# Patient Record
Sex: MALE | Race: WHITE | NOT HISPANIC OR LATINO
[De-identification: names, ages, dates, MRNs, and addresses within clinical notes are randomized per-mention and may not be internally consistent; named-entity substitution may affect disease eponyms.]

---

## 2019-10-31 ENCOUNTER — FORM ENCOUNTER (OUTPATIENT)
Age: 64
End: 2019-10-31

## 2019-11-01 ENCOUNTER — APPOINTMENT (OUTPATIENT)
Dept: ORTHOPEDIC SURGERY | Facility: CLINIC | Age: 64
End: 2019-11-01
Payer: COMMERCIAL

## 2019-11-01 ENCOUNTER — OUTPATIENT (OUTPATIENT)
Dept: OUTPATIENT SERVICES | Facility: HOSPITAL | Age: 64
LOS: 1 days | End: 2019-11-01
Payer: COMMERCIAL

## 2019-11-01 VITALS
DIASTOLIC BLOOD PRESSURE: 80 MMHG | HEIGHT: 71 IN | SYSTOLIC BLOOD PRESSURE: 120 MMHG | WEIGHT: 200 LBS | BODY MASS INDEX: 28 KG/M2

## 2019-11-01 DIAGNOSIS — M75.42 IMPINGEMENT SYNDROME OF LEFT SHOULDER: ICD-10-CM

## 2019-11-01 DIAGNOSIS — M75.02 ADHESIVE CAPSULITIS OF LEFT SHOULDER: ICD-10-CM

## 2019-11-01 PROCEDURE — 99204 OFFICE O/P NEW MOD 45 MIN: CPT | Mod: 25

## 2019-11-01 PROCEDURE — 73030 X-RAY EXAM OF SHOULDER: CPT

## 2019-11-01 PROCEDURE — 20611 DRAIN/INJ JOINT/BURSA W/US: CPT | Mod: LT

## 2019-11-01 PROCEDURE — 73030 X-RAY EXAM OF SHOULDER: CPT | Mod: 26,LT

## 2019-11-01 RX ORDER — EZETIMIBE AND SIMVASTATIN 10; 40 MG/1; MG/1
10-40 TABLET ORAL
Refills: 0 | Status: ACTIVE | COMMUNITY

## 2019-11-01 RX ORDER — APREMILAST 30 MG/1
TABLET, FILM COATED ORAL
Refills: 0 | Status: ACTIVE | COMMUNITY

## 2019-11-01 NOTE — PROCEDURE
[de-identified] : ULTRASOUND-GUIDED Injection: Left  Shoulder Subacromial Space.\par Indication: Impingement and Adhesive Capsulitis\par Indication for Ultrasound: To avoid neurovascular structures and ensure placement into subacromial space\par \par A discussion was had with the patient regarding this procedure and all questions were answered. All risks, benefits and alternatives were discussed. These included but were not limited to bleeding, infection, and allergic reaction.  A timeout was done to ensure correct side and pt agreed to the procedure. Utilizing Sonosite Ultrasound, sterilized probe, and utilizing sterile technique, the subacromial bursa was visualized under ultrasound.   Alcohol was used to clean the skin, and betadine was used to sterilize and prep the area in the anterior aspect of the shoulder. Utilizing an in-line approach to the ultrasound probe,a 25-gauge 1.5" needle was used to inject 4cc of 1% lidocaine without epinephrine and 1cc of 40mg/ml methylprednisolone into the subacromial space. A sterile bandage was then applied. The patient tolerated the procedure well and there were no complications.

## 2019-11-01 NOTE — PHYSICAL EXAM
[de-identified] : General: Well-nourished, well-developed, alert, and in no acute distress.\par Head: Normocephalic.\par Eyes: Pupils equal round reactive to light and accommodation, extraocular muscles intact, normal sclera.\par Nose: No nasal discharge.\par Cardiac: Regular rate. Extremities are warm and well perfused. Distal pulses are symmetric bilaterally.\par Respiratory: No labored breathing.\par Extremities: Sensation is intact distally bilaterally.  Distal pulses are symmetric bilaterally\par Neurologic: No focal deficits\par Skin: Normal skin color, texture, and turgor\par Psychiatric: Normal affect\par \par RIGHT SHOULDER:\par  \par Inspection:no bruising, rash, erythema, deformity\par Joint Effusion:no\par ROM:normal Forward Flexion, Abduction , Internal Rotation: , External Rotation \par Palpation: AC joint pain, Bicipital Groove Pain , GH joint pain , Clavicle pain , GT pain \par Distal Pulses:normal\par Upper Extremity Reflexes:2+\par Shoulder Strength: 5/5 \par Upper Extremity Sensation: normal\par \par Special Tests:\par Empty Can: Negative \par Lift-Off Test: Negative\par Cross Arm: Negative\par Neer: Negative\par Calzada: Negative\par Speed: Negative\par Apprehension:Negative\par \par LEFT SHOULDER:\par  \par Inspection:no bruising, rash, erythema, deformity\par Joint Effusion:no\par ROM:FORWARD FLEXION  DEGREES, ABDUCTION  DEGREES, INTERNAL ROTATION LIMITED TO SACRUM COMPARED TO MID THORAX CONTRALATERALLY, EXTERNAL ROTATION TO 60 DEGREES COMPARED TO 80 DEGREES CONTRALATERALLY\par Palpation: MILD SUBACROMIAL SPACE TENDERNESS, MILD BICIPITAL GROOVE PAIN, NO PAIN AT AC joint, Clavicle pain \par Distal Pulses:normal\par Upper Extremity Reflexes:2+\par Shoulder Strength: 5/5 \par Upper Extremity Sensation: normal\par \par Special Tests:\par Empty Can: MILDLY POSITIVE\par Lift-Off Test: POSITIVE\par O'Briens: Negative\par Cross Arm: Negative\par Neer: POSITIVE\par Calzada: POSITIVE\par Speed: MILDLY POSITIVE\par Apprehension:Negative\par \par \par  [de-identified] : Xray Left Shoulder - Multiple views were reviewed with the patient in detail.  \par \par FINDINGS: 4 views of the left shoulder demonstrate no fracture or \par dislocation. Productive changes at the acromioclavicular joint. The \par glenohumeral joint is preserved. No soft tissue abnormalities are seen. The \par visualized lung is clear. \par \par \par IMPRESSION: \par Mild arthrosis of the acromioclavicular joint. \par

## 2019-11-01 NOTE — REVIEW OF SYSTEMS
[Negative] : Heme/Lymph [Joint Pain] : joint pain [Joint Stiffness] : joint stiffness [Joint Swelling] : joint swelling [Muscle Weakness] : muscle weakness

## 2019-11-01 NOTE — HISTORY OF PRESENT ILLNESS
[de-identified] : The patient is a 63 year old, right hand dominant man presenting with left shoulder pain.  He complains of several month history of anterolateral shoulder pain without precipitating injury or trauma.  He enjoys tennis.  Pain is worse with overhead movement and sleeping on the affected side.  He denies neck pain, distal numbness, weakness, paresthesias.  He has been resting the area and tried massage with minimal relief of symptoms.  \par \par Pain is rated 5/10, described as achy, shooting, improved with rest, worse with overhead movements. [5] : a current pain level of 5/10

## 2019-11-01 NOTE — DISCUSSION/SUMMARY
[Medication Risks Reviewed] : Medication risks reviewed [de-identified] : The patient is a 63 year old, right hand dominant man with a history of CAD on xarelto who presents with a several month history of left shoulder pain likely secondary to adhesive capsulitis/impingement syndrome.\par \par Imaging was reviewed with the patient in detail.  All questions were answered appropriately.\par \par ---------------------------------------------------------------------------------------------------------------------------------------------------------------------------\par Treatment options were discussed with the patient in detail including no treatment, observation, protection-rest-ice-compression-elevation, activity modification, physical therapy, home exercise program, medications, injections, surgery.\par \par After informed consent, and explanation of risks, benefits, alternatives, adverse effects of injection, which includes but is not limited to infection, bleeding, allergic reaction, swelling, failure to improve symptoms, the patient would like to proceed with the procedure - LEFT ULTRASOUND GUIDED SUBACROMIAL INJECTION.  See procedure note above. Patient tolerated the procedure well. The patient was provided with postinjection instructions.\par \par Patient was prescribed a course of physical therapy today.  The patient was also provided some general home exercises.  The patient was counseled on activity modification.\par \par We will avoid NSAIDs given his history of CAD.\par \par Follow-up in 4-6 weeks.  If pain persists, we will consider MRI.\par \par Patient appreciates and agrees with current plan.\par \par This note was generated using dragon medical dictation software.  A reasonable effort has been made for proofreading its contents, but typos may still remain.  If there are any questions or points of clarification needed please notify my office.\par \par

## 2019-12-05 ENCOUNTER — FORM ENCOUNTER (OUTPATIENT)
Age: 64
End: 2019-12-05

## 2019-12-06 ENCOUNTER — APPOINTMENT (OUTPATIENT)
Dept: ORTHOPEDIC SURGERY | Facility: CLINIC | Age: 64
End: 2019-12-06
Payer: COMMERCIAL

## 2019-12-06 ENCOUNTER — OUTPATIENT (OUTPATIENT)
Dept: OUTPATIENT SERVICES | Facility: HOSPITAL | Age: 64
LOS: 1 days | End: 2019-12-06
Payer: COMMERCIAL

## 2019-12-06 VITALS
HEART RATE: 81 BPM | OXYGEN SATURATION: 97 % | DIASTOLIC BLOOD PRESSURE: 80 MMHG | WEIGHT: 200 LBS | BODY MASS INDEX: 28 KG/M2 | SYSTOLIC BLOOD PRESSURE: 120 MMHG | HEIGHT: 71 IN

## 2019-12-06 PROCEDURE — 20611 DRAIN/INJ JOINT/BURSA W/US: CPT | Mod: RT

## 2019-12-06 PROCEDURE — 73070 X-RAY EXAM OF ELBOW: CPT | Mod: 26,RT

## 2019-12-06 PROCEDURE — 73070 X-RAY EXAM OF ELBOW: CPT

## 2019-12-06 PROCEDURE — 99213 OFFICE O/P EST LOW 20 MIN: CPT | Mod: 25

## 2019-12-06 NOTE — DISCUSSION/SUMMARY
[Medication Risks Reviewed] : Medication risks reviewed [de-identified] : The patient is a 63 year old, right hand dominant man who presents with a chronic history of right medial elbow pain likely secondary to medial epicondylitis.\par \par Imaging was reviewed with the patient in detail.  All questions were answered appropriately.\par \par After informed consent, and explanation of risks, benefits, alternatives, adverse effects of injection, which includes but is not limited to infection, bleeding, allergic reaction, swelling, failure to improve symptoms, the patient would like to proceed with the procedure - ULTRASOUND-GUIDED PERITENDINOUS CORTICOSTEROID INJECTION OF THE COMMON FLEXOR BUNDLE/MEDIAL EPICONDYLE. See procedure note above. Patient tolerated the procedure well. The patient was provided with postinjection instructions.\par \par He was counseled on activity modification.  He was given a list of home exercises.  He may try the counterforce brace.\par \par Follow-up in 4 weeks or as needed.\par \par Patient appreciates and agrees with current plan.\par \par This note was generated using dragon medical dictation software.  A reasonable effort has been made for proofreading its contents, but typos may still remain.  If there are any questions or points of clarification needed please notify my office.\par \par \par \par

## 2019-12-06 NOTE — PHYSICAL EXAM
[de-identified] : General: Well-nourished, well-developed, alert, and in no acute distress.\par Head: Normocephalic.\par Eyes: Pupils equal round reactive to light and accommodation, extraocular muscles intact, normal sclera.\par Nose: No nasal discharge.\par Cardiac: Regular rate. Extremities are warm and well perfused. Distal pulses are symmetric bilaterally.\par Respiratory: No labored breathing.\par Extremities: Sensation is intact distally bilaterally.  Distal pulses are symmetric bilaterally\par Neurologic: No focal deficits\par Skin: Normal skin color, texture, and turgor\par Psychiatric: Normal affect\par \par RIGHT ELBOW:\par \par Inspection: no rash, erythema, deformity\par Joint Effusion: no\par ROM: elbow extension 0 , elbow flexion 140 , supination 45, pronation 45\par Palpation: MODERATE PAIN AT MEDIAL EPICONDYLE, NO pain at lateral epicondyle, at distal triceps, at distal biceps, at proximal biceps, at olecranon\par Distal Pulses: normal\par Upper Extremity Sensation: normal\par Upper Extremity Reflexes: normal\par \par Special Tests:\par Resisted wrist extension: negative\par Resisted middle finger extension: negative\par Resisted wrist flexion: POSITIVE\par Resisted supination: negative\par Resisted pronation: POSITIVE\par \par LEFT ELBOW:\par \par Inspection: no rash, erythema, deformity\par Joint Effusion: no\par ROM: elbow extension 0 , elbow flexion 140 , supination 45, pronation 45\par Palpation: NO pain at lateral epicondyle, pain at medial epicondyle, pain at distal triceps, pain at distal biceps, pain at proximal biceps, pain at olecranon\par Distal Pulses: normal\par Upper Extremity Sensation: normal\par Upper Extremity Reflexes: normal\par \par Special Tests:\par Resisted wrist extension: negative\par Resisted middle finger extension: negative\par Resisted wrist flexion: negative\par Resisted supination: negative\par Resisted pronation: negative\par \par  [de-identified] : Xray Right Elbow - Multiple views were reviewed with the patient in detail. \par \par FINDINGS: 2 views of the right elbow are submitted. No prior imaging \par available for comparison. \par \par There are no acute fractures or dislocations. No significant soft tissue \par swelling or joint effusions. Minimal marginal osteophyte formation is \par present.

## 2019-12-06 NOTE — HISTORY OF PRESENT ILLNESS
[de-identified] : The patient is a 63 year old man who presents with right elbow pain.\par \par The patient is right hand dominant.  He has a history of CAD on Xarelto.  He was previously seen for left shoulder and had a cortisone injection and his pain is improved.\par \par He complains of chronic, atraumatic right medial elbow pain.  He enjoys playing tennis.  Pain has been progressing over the last few months.  He has tried a counterforce brce without significant improvement of pain.  Pain is moderate in intensity, worse with wrist flexion.  He denies shoulder or wrist pain.  He denies decreased  strength or muscle atrophy. \par

## 2019-12-06 NOTE — PROCEDURE
[de-identified] : ULTRASOUND-GUIDED PERITENDINOUS INJECTION FOR MEDIAL EPICONDYLITIS\par \par Indication for Ultrasound: To localize common medial elbow structures and avoidance of neurovascular structures\par Injection: Right elbow.\par Indication: MEDIAL epicondylitis.\par \par A discussion was had with the patient regarding this procedure and all questions were answered. All risks, benefits and alternatives were discussed. These included but were not limited to bleeding, infection, and allergic reaction. Alcohol was used to clean the skin, and betadine was used to sterilize and prep the area in the lateral aspect of the elbow. A timeout was done to ensure correct side and pt agreed to the procedure.  Utilizing a Sonosite, sterilized linear probe, with localization of the common flexor tendons and medial epicondyle, an in-line approach was used.   A 25-gauge needle was used to inject 1cc of 1% lidocaine and 1cc of 40mg/ml methylprednisolone into the peritendinous region of the common flexor bundle immediately distal to the medial epicondyle using a needling technique. A sterile bandage was then applied. The patient tolerated the procedure well and there were no complications.

## 2019-12-09 ENCOUNTER — FORM ENCOUNTER (OUTPATIENT)
Age: 64
End: 2019-12-09

## 2019-12-10 ENCOUNTER — OUTPATIENT (OUTPATIENT)
Dept: OUTPATIENT SERVICES | Facility: HOSPITAL | Age: 64
LOS: 1 days | End: 2019-12-10
Payer: COMMERCIAL

## 2019-12-10 ENCOUNTER — APPOINTMENT (OUTPATIENT)
Dept: ORTHOPEDIC SURGERY | Facility: CLINIC | Age: 64
End: 2019-12-10
Payer: COMMERCIAL

## 2019-12-10 VITALS — WEIGHT: 205 LBS | HEIGHT: 71 IN | BODY MASS INDEX: 28.7 KG/M2

## 2019-12-10 DIAGNOSIS — M51.36 OTHER INTERVERTEBRAL DISC DEGENERATION, LUMBAR REGION: ICD-10-CM

## 2019-12-10 DIAGNOSIS — M70.72 OTHER BURSITIS OF HIP, LEFT HIP: ICD-10-CM

## 2019-12-10 PROCEDURE — 73502 X-RAY EXAM HIP UNI 2-3 VIEWS: CPT

## 2019-12-10 PROCEDURE — 73502 X-RAY EXAM HIP UNI 2-3 VIEWS: CPT | Mod: 26,LT

## 2019-12-10 PROCEDURE — 99213 OFFICE O/P EST LOW 20 MIN: CPT

## 2019-12-10 RX ORDER — DICLOFENAC SODIUM 10 MG/G
1 GEL TOPICAL
Qty: 1 | Refills: 2 | Status: ACTIVE | COMMUNITY
Start: 2019-12-10 | End: 1900-01-01

## 2019-12-10 NOTE — END OF VISIT
[FreeTextEntry3] : All medical record entries made by Brit Garnica acting as a scribe for the performing provider (Simon Sparks MD and/or JOELLE Webb) on 12/10/2019. All entries were dictated to me by the performing medical provider. In signing this record, the medical provider affirms that they have personally performed the history, physical exam, assessment and plan and have also directed, reviewed and agreed to the documentation in the chart.

## 2019-12-10 NOTE — PHYSICAL EXAM
[de-identified] : X-ray imaging of the AP pelvis and bilateral hips done here today demonstrates right hip with well-fixed right THR in overall good alignment, left hip with mild osteoarthritis, mild degenerative changes in spine\par  [de-identified] : Well appearing. NAD. Alert and oriented x 3. No respiratory distress.\par Bilateral upper extremities: Skin intact. No deformity. Painless active ROM without evident restriction.\par Cervical, thoracic and lumbar spine: No visible deformity. Painless active ROM without evident restriction. No radicular pain on passive straight leg raise bilaterally.\par \par Pelvis: No pelvic obliquity. No tenderness.\par Leg lengths: Equal.\par \par Gait: Normal.\par Ambulatory assist devices: None.\par \par Right Hip:\par Skin intact. (+) Well healed surgical scar. No erythema. No ecchymosis. No swelling. No deformity. No focal tenderness.\par Painless ROM from full extension to 105 degrees of flexion. 5 degrees of internal rotation. 45 degrees of external rotation. 50 degrees of abduction. 5 degrees of adduction.\par OLEG painless. Impingement (FADIR) painless. Stinchfield painless.\par No crepitation. No instability. Flexor power 5/5.\par \par Left Hip:\par Skin intact. No surgical scars. No erythema. No ecchymosis. No swelling. No deformity. (+) Lateral hip tenderness. (+) Tight IT band and rectus femoris. \par Painful ROM from full extension to  degrees of flexion. 5 degrees of internal rotation. 45 degrees of external rotation. 50-55 degrees of abduction. 5-10 degrees of adduction.\par OLEG painful. Impingement (FADIR) painful. Stinchfield painless.\par No crepitation. No instability\par \par Bilateral Knees: Skin intact. No surgical scars. No erythema or ecchymosis. No swelling or effusion. No deformity. Painless and unrestricted range of motion. Central patellar tracking. No crepitation. No instability. \par \par Neurological: Intact distal crude touch sensation. Normal distal motor power. \par Cardiovascular: Lower extremities warm and well perfused. No peripheral edema.

## 2019-12-10 NOTE — DISCUSSION/SUMMARY
[de-identified] : Mr. Kraus presents with left hip pain secondary to DJD. We discussed the diagnosis and prognosis of the patient's condition. Non surgical treatment options include physical therapy and low impact exercise, weight loss for those who are overweight, NSAID and analgesic use, joint injections and activity modification including the use of assistive devices. I wrote a physical therapy prescription. I wrote a prescription for Voltaren gel because patient is on Plavix and Xarelto and cannot take oral NSAIDs. We discussed an injection to the left hip bursa or joint if patient's pain worsens.\par Patient is also s/p right THR and doing well overall.\par Follow up PRN.

## 2019-12-10 NOTE — HISTORY OF PRESENT ILLNESS
[de-identified] : 63 year old M presents today for follow up evaluation of left hip pain. He was last seen by me in April of 2016. He reports moderate, daily left hip pain localized to the side of the hip that occurs after prolonged sitting and at night. He states that his pain is most severe when he lies in bed. He has difficulty placing shoes on both feet. Patient can walk greater than 10 blocks with a moderate limp. He uses stairs normally. He can enter public transportation and can sit comfortably in an ordinary chair. \par Patient is s/p right THR 2/23/12 and doing well. He reports slight, occasional right hip pain localized to the side of the hip that occurs after prolonged sitting and at night.\par Of note, patient is on Plavix (s/p stent placement) and Xarelto (for A fib).

## 2020-02-12 ENCOUNTER — APPOINTMENT (OUTPATIENT)
Dept: ORTHOPEDIC SURGERY | Facility: CLINIC | Age: 65
End: 2020-02-12

## 2020-02-12 VITALS — HEIGHT: 71 IN | BODY MASS INDEX: 28.7 KG/M2 | WEIGHT: 205 LBS

## 2020-02-14 ENCOUNTER — APPOINTMENT (OUTPATIENT)
Dept: ORTHOPEDIC SURGERY | Facility: CLINIC | Age: 65
End: 2020-02-14

## 2020-02-14 NOTE — PHYSICAL EXAM
[de-identified] : General: Well-nourished, well-developed, alert, and in no acute distress.\par Head: Normocephalic.\par Eyes: Pupils equal round reactive to light and accommodation, extraocular muscles intact, normal sclera.\par Nose: No nasal discharge.\par Cardiac: Regular rate. Extremities are warm and well perfused. Distal pulses are symmetric bilaterally.\par Respiratory: No labored breathing.\par Extremities: Sensation is intact distally bilaterally.  Distal pulses are symmetric bilaterally\par Neurologic: No focal deficits\par Skin: Normal skin color, texture, and turgor\par Psychiatric: Normal affect\par \par RIGHT ELBOW:\par \par Inspection: no rash, erythema, deformity\par Joint Effusion: no\par ROM: elbow extension 0 , elbow flexion 140 , supination 45, pronation 45\par Palpation: MODERATE PAIN AT MEDIAL EPICONDYLE, NO pain at lateral epicondyle, at distal triceps, at distal biceps, at proximal biceps, at olecranon\par Distal Pulses: normal\par Upper Extremity Sensation: normal\par Upper Extremity Reflexes: normal\par \par Special Tests:\par Resisted wrist extension: negative\par Resisted middle finger extension: negative\par Resisted wrist flexion: POSITIVE\par Resisted supination: negative\par Resisted pronation: POSITIVE\par \par LEFT ELBOW:\par \par Inspection: no rash, erythema, deformity\par Joint Effusion: no\par ROM: elbow extension 0 , elbow flexion 140 , supination 45, pronation 45\par Palpation: NO pain at lateral epicondyle, pain at medial epicondyle, pain at distal triceps, pain at distal biceps, pain at proximal biceps, pain at olecranon\par Distal Pulses: normal\par Upper Extremity Sensation: normal\par Upper Extremity Reflexes: normal\par \par Special Tests:\par Resisted wrist extension: negative\par Resisted middle finger extension: negative\par Resisted wrist flexion: negative\par Resisted supination: negative\par Resisted pronation: negative\par \par

## 2020-02-14 NOTE — DISCUSSION/SUMMARY
[de-identified] : The patient is a 63 year old, right hand dominant man with history of CAD on Xarelto who presents with a chronic history of right medial elbow pain likely secondary to medial epicondylitis.\par \par We discussed the natural progression of the disease today.\par \par We discussed that repeated corticosteroid injections to the area have potential for short and long term side effects, including but not limited to tendon damage, soft tissue degradation, swelling, failure to improve symptoms.  After expressing understanding of these risks, the patient would still like to move forward with injection.\par \par We discussed other injection based options including lidocaine injection/needling, biologic therapy such as PRP, eccentric band exercises.\par \par After informed consent, and explanation of risks, benefits, alternatives, adverse effects of injection, which includes but is not limited to infection, bleeding, allergic reaction, swelling, failure to improve symptoms, the patient would like to proceed with the procedure. See procedure note above. Patient tolerated the procedure well. The patient was provided with postinjection instructions.\par \par \par \par

## 2020-02-14 NOTE — HISTORY OF PRESENT ILLNESS
[de-identified] : The patient is a 63 year old, RHD man with history of CAD on Xarelto presenting for follow-up for right elbow pain.\par \par He was last seen about two months ago on 12/6/2019 for chronic right medial elbow pain attributed to medial epicondylitis.  He had a ultrasound-guided peritendinous corticosteroid injection.  He was instructed on exercises and the use of a counterforce brace.\par

## 2020-02-25 ENCOUNTER — APPOINTMENT (OUTPATIENT)
Dept: ORTHOPEDIC SURGERY | Facility: CLINIC | Age: 65
End: 2020-02-25

## 2020-03-12 ENCOUNTER — APPOINTMENT (OUTPATIENT)
Dept: ORTHOPEDIC SURGERY | Facility: CLINIC | Age: 65
End: 2020-03-12
Payer: COMMERCIAL

## 2020-03-12 VITALS
SYSTOLIC BLOOD PRESSURE: 130 MMHG | HEART RATE: 72 BPM | HEIGHT: 71 IN | DIASTOLIC BLOOD PRESSURE: 70 MMHG | OXYGEN SATURATION: 98 % | WEIGHT: 205 LBS | BODY MASS INDEX: 28.7 KG/M2

## 2020-03-12 DIAGNOSIS — M16.12 UNILATERAL PRIMARY OSTEOARTHRITIS, LEFT HIP: ICD-10-CM

## 2020-03-12 DIAGNOSIS — M70.62 TROCHANTERIC BURSITIS, LEFT HIP: ICD-10-CM

## 2020-03-12 DIAGNOSIS — M77.01 MEDIAL EPICONDYLITIS, RIGHT ELBOW: ICD-10-CM

## 2020-03-12 PROCEDURE — 99213 OFFICE O/P EST LOW 20 MIN: CPT | Mod: 25

## 2020-03-12 PROCEDURE — 20611 DRAIN/INJ JOINT/BURSA W/US: CPT | Mod: RT

## 2020-03-12 NOTE — PHYSICAL EXAM
[de-identified] : General: Well-nourished, well-developed, alert, and in no acute distress.\par Head: Normocephalic.\par Eyes: Pupils equal round reactive to light and accommodation, extraocular muscles intact, normal sclera.\par Nose: No nasal discharge.\par Cardiac: Regular rate. Extremities are warm and well perfused. Distal pulses are symmetric bilaterally.\par Respiratory: No labored breathing.\par Extremities: Sensation is intact distally bilaterally.  Distal pulses are symmetric bilaterally\par Neurologic: No focal deficits\par Skin: Normal skin color, texture, and turgor\par Psychiatric: Normal affect\par \par RIGHT ELBOW:\par \par Inspection: no rash, erythema, deformity\par Joint Effusion: no\par ROM: elbow extension 0 , elbow flexion 140 , supination 45, pronation 45\par Palpation: MODERATE PAIN AT MEDIAL EPICONDYLE, NO pain at lateral epicondyle, at distal triceps, at distal biceps, at proximal biceps, at olecranon\par Distal Pulses: normal\par Upper Extremity Sensation: normal\par Upper Extremity Reflexes: normal\par \par Special Tests:\par Resisted wrist extension: negative\par Resisted middle finger extension: negative\par Resisted wrist flexion: POSITIVE\par Resisted supination: negative\par Resisted pronation: POSITIVE\par \par LEFT ELBOW:\par \par Inspection: no rash, erythema, deformity\par Joint Effusion: no\par ROM: elbow extension 0 , elbow flexion 140 , supination 45, pronation 45\par Palpation: NO pain at lateral epicondyle, pain at medial epicondyle, pain at distal triceps, pain at distal biceps, pain at proximal biceps, pain at olecranon\par Distal Pulses: normal\par Upper Extremity Sensation: normal\par Upper Extremity Reflexes: normal\par \par Special Tests:\par Resisted wrist extension: negative\par Resisted middle finger extension: negative\par Resisted wrist flexion: negative\par Resisted supination: negative\par Resisted pronation: negative\par \par LEFT HIP:\par \par Inspection: no bruising, erythema, rash, deformity \par Palpation: PAIN AT GREATER TROCHANTER, MILD PAIN AT GLUT MED FOOTPRINT, No ITB pain , Hip Flexor pain  , Piriformis pain , Proximal Hamstring Pain , Groin pain \par ROM: normal Internal Rotation WITH MILD PAIN , External Rotation\par Strength: 5/5 Hip Flexion, Hip Extension, Hip Abduction, Hip Adduction\par \par Distal Pulses: normal\par Lower Extremity Sensation: normal \par Patellar/Achilles Reflex: normal\par \par Special Tests:\par Stinchfield: POSITIVE\par Log Roll: MILDLY POSITIVE\par FABERE: POSITIVE\par FADIR: POSITIVE\par Obers: NEGATIVE\par \par RIGHT HIP:\par \par Inspection: no bruising, erythema, rash, deformity \par Palpation: No Greater Trochanter pain , ITB pain , Hip Flexor pain  , Piriformis pain , Proximal Hamstring Pain , Groin pain \par ROM: normal Internal Rotation , External Rotation\par Strength: 5/5 Hip Flexion, Hip Extension, Hip Abduction, Hip Adduction\par \par Distal Pulses: normal\par Lower Extremity Sensation: normal \par Patellar/Achilles Reflex: normal\par \par Special Tests:\par Stinchfield: NEGATIVE \par Log Roll: NEGATIVE\par FABERE: NEGATIVE\par FADIR: NEGATIVE\par \par \par

## 2020-03-12 NOTE — HISTORY OF PRESENT ILLNESS
[de-identified] : The patient is a 63 year old, RHD man with history of CAD on Xarelto presenting for follow-up for right elbow pain.\par \par He was last seen about three months ago on 12/6/2019 for chronic right medial elbow pain attributed to medial epicondylitis.  He had a ultrasound-guided peritendinous corticosteroid injection which provided moderate, but temporary relief of symptoms.  He was instructed on exercises and the use of a counterforce brace.  He has been restricting his tennis play.\par \par He also complains of chronic left anterolateral hip pain.  He previously saw Dr. Sparks in December 2019 and was diagnosed with mild left hip OA and trochanteric bursitis.  He was instructed to start physical therapy which provided mild relief.  He has a history of left THR.  He denies focal back pain.  The patient denies distal numbness, weakness, paresthesias.  He complains of a catching type pain with some hip movements.\par \par

## 2020-03-12 NOTE — DISCUSSION/SUMMARY
[de-identified] : The patient is a 63 year old, right hand dominant man with history of CAD on Xarelto who presents with a chronic history of right medial elbow pain likely secondary to medial epicondylitis.\par \par We discussed the natural progression of the disease today.\par \par After informed consent, and explanation of risks, benefits, alternatives, adverse effects of injection, which includes but is not limited to infection, bleeding, allergic reaction, swelling, failure to improve symptoms, the patient would like to proceed with the procedure - RIGHT ULTRASOUND-GUIDED MEDIAL EPICONDYLE/COMMON FLEXOR TENDON CORTISONE INJECTION. See procedure note above. Patient tolerated the procedure well. The patient was provided with postinjection instructions.\par \par He should continue eccentric exercises.\par \par We discussed limiting future elbow cortisone injections.  We discussed benefit or PRP/biologic injections.\par \par He also complains of chronic left anterolateral hip pain.  His impingement signs are positive and he has evidence of mild left hip osteoarthritis.  His pain generator appears to be related to greater trochanteric pain syndrome.\par \par After informed consent, and explanation of risks, benefits, alternatives, adverse effects of injection, which includes but is not limited to infection, bleeding, allergic reaction, swelling, failure to improve symptoms, the patient would like to proceed with the procedure - LEFT ULTRASOUND-GUIDED CORTICOSTEROID INJECTION OF TROCHANTERIC BURSA. See procedure note above. Patient tolerated the procedure well. The patient was provided with postinjection instructions.\par \par He should continue home exercise program.  We discussed serial observation of left hip OA.  We may consider MRI imaging if symptoms persists.\par \par Follow-up in 4-6 weeks.\par \par Patient appreciates and agrees with current plan.\par \par This note was generated using dragon medical dictation software.  A reasonable effort has been made for proofreading its contents, but typos may still remain.  If there are any questions or points of clarification needed please notify my office.\par \par \par \par \par \par \par

## 2020-03-12 NOTE — PROCEDURE
[de-identified] : ULTRASOUND-GUIDED INJECTION FOR MEDIAL EPICONDYLITIS\par \par Indication for Ultrasound: To localize common medial elbow structures and avoidance of neurovascular structures\par Injection: Right elbow.\par Indication: MEDIAL epicondylitis.\par \par A discussion was had with the patient regarding this procedure and all questions were answered. All risks, benefits and alternatives were discussed. These included but were not limited to bleeding, infection, and allergic reaction. Alcohol was used to clean the skin, and betadine was used to sterilize and prep the area in the lateral aspect of the elbow. A timeout was done to ensure correct side and pt agreed to the procedure.  Utilizing a Sonosite, sterilized linear probe, with localization of the common flexor tendons and medial epicondyle, an in-line approach was used.   A 25-gauge needle was used to inject 1cc of 1% lidocaine and 1cc of 40mg/ml methylprednisolone into the peritendinous region of the common flexor bundle immediately distal to the medial epicondyle using a needling technique. A sterile bandage was then applied. The patient tolerated the procedure well and there were no complications.\par \par ULTRASOUND-GUIDED TROCHANTERIC BURSA INJECTION\par Indication for Ultrasound: To visualize trochanteric bursa and avoid damage to tendons and surrounding neurovascular structures\par \par Injection: LEFT Hip.\par Indication: Trochanteric Bursitis.\par \par A discussion was had with the patient regarding this procedure and all questions were answered. All risks, benefits and alternatives were discussed. These included but were not limited to bleeding, infection, and allergic reaction. A timeout was done to ensure correct side and pt agreed to the procedure.  Alcohol was used to clean the skin, and betadine was used to sterilize and prep the area in the lateral aspect of the hip. The trochanteric bursa was visualized utilizing the Sonosite, linear transducer. The joint was visualized in the short axis and an in-plane approach was used for the injection. Ultrasound guidance was utilized to ensure accuracy of the injection.  A 25-gauge needle was used to inject 2cc of 1% lidocaine and 1cc of 40mg/ml methylprednisolone into the greater trochanteric bursa using a needling technique. A sterile bandage was then applied. The patient tolerated the procedure well and there were no complications.

## 2022-04-08 ENCOUNTER — NEW PATIENT (OUTPATIENT)
Dept: URBAN - METROPOLITAN AREA CLINIC 9 | Facility: CLINIC | Age: 67
End: 2022-04-08

## 2022-04-08 DIAGNOSIS — H04.123: ICD-10-CM

## 2022-04-08 DIAGNOSIS — H52.13: ICD-10-CM

## 2022-04-08 DIAGNOSIS — H25.13: ICD-10-CM

## 2022-04-08 PROCEDURE — 92004 COMPRE OPH EXAM NEW PT 1/>: CPT

## 2022-04-08 PROCEDURE — 92015 DETERMINE REFRACTIVE STATE: CPT

## 2022-04-08 ASSESSMENT — KERATOMETRY
OD_K2POWER_DIOPTERS: 45.25
OD_K1POWER_DIOPTERS: 44.75
OS_K2POWER_DIOPTERS: 44.50
OS_AXISANGLE_DEGREES: 082
OS_AXISANGLE2_DEGREES: 172
OD_AXISANGLE2_DEGREES: 59
OD_AXISANGLE_DEGREES: 149
OS_K1POWER_DIOPTERS: 44.00

## 2022-04-08 ASSESSMENT — VISUAL ACUITY
OD_SC: J3
OS_SC: 20/30-1
OD_SC: 20/30-2
OS_SC: J1

## 2022-04-08 ASSESSMENT — TONOMETRY
OD_IOP_MMHG: 15
OS_IOP_MMHG: 15

## 2022-07-06 RX ORDER — EZETIMIBE AND SIMVASTATIN 10; 40 MG/1; MG/1
TABLET ORAL
COMMUNITY

## 2022-07-06 RX ORDER — CLOPIDOGREL BISULFATE 75 MG/1
TABLET ORAL
COMMUNITY

## 2022-07-06 RX ORDER — LOSARTAN POTASSIUM 25 MG/1
TABLET ORAL
COMMUNITY

## 2022-07-06 RX ORDER — METOPROLOL SUCCINATE 25 MG/1
TABLET, EXTENDED RELEASE ORAL
COMMUNITY